# Patient Record
Sex: MALE | NOT HISPANIC OR LATINO
[De-identification: names, ages, dates, MRNs, and addresses within clinical notes are randomized per-mention and may not be internally consistent; named-entity substitution may affect disease eponyms.]

---

## 2021-06-07 ENCOUNTER — DASHBOARD ENCOUNTERS (OUTPATIENT)
Age: 51
End: 2021-06-07

## 2021-06-07 ENCOUNTER — OFFICE VISIT (OUTPATIENT)
Dept: URBAN - METROPOLITAN AREA CLINIC 12 | Facility: CLINIC | Age: 51
End: 2021-06-07
Payer: COMMERCIAL

## 2021-06-07 DIAGNOSIS — K64.9 HEMORRHOIDS, UNSPECIFIED HEMORRHOID TYPE: ICD-10-CM

## 2021-06-07 DIAGNOSIS — I10 ESSENTIAL HYPERTENSION: ICD-10-CM

## 2021-06-07 DIAGNOSIS — Z12.11 COLON CANCER SCREENING: ICD-10-CM

## 2021-06-07 DIAGNOSIS — K60.2 ANAL FISSURE: ICD-10-CM

## 2021-06-07 DIAGNOSIS — E66.01 MORBID OBESITY: ICD-10-CM

## 2021-06-07 PROBLEM — 59621000: Status: ACTIVE | Noted: 2021-06-07

## 2021-06-07 PROBLEM — 70153002: Status: ACTIVE | Noted: 2021-06-07

## 2021-06-07 PROBLEM — 238136002: Status: ACTIVE | Noted: 2021-06-07

## 2021-06-07 PROBLEM — 30037006: Status: ACTIVE | Noted: 2021-06-07

## 2021-06-07 PROCEDURE — 99203 OFFICE O/P NEW LOW 30 MIN: CPT | Performed by: INTERNAL MEDICINE

## 2021-06-07 RX ORDER — POLYETHYLENE GLYCOL 3350, SODIUM SULFATE, SODIUM CHLORIDE, POTASSIUM CHLORIDE, ASCORBIC ACID, SODIUM ASCORBATE 140-9-5.2G
AS DIRECTED KIT ORAL
Qty: 1 BOX | Refills: 0 | OUTPATIENT
Start: 2021-06-07 | End: 2021-06-09

## 2021-06-07 NOTE — HPI-TODAY'S VISIT:
51-year-old  gentleman who was referred for a GI evaluation by [facility].  He states that he was diagnosed with an anal fissure and had surgery in 1990. He also states that he has a history of hemorrhoids noted with history of small amount of discomfort with excessive wiping and small amount of blood. However there's otherwise been no rectal bleeding or melanoma or anemia or change in bowel habits or weight loss. He does have a history of hypertension and is on valsartan, hydrochlorothiazide and clonidine for this. He has type 2 diabetes mellitus and is on metformin for this. There has been no chest pain or shortness of breath or palpitations or heart problems. He has never had a colonoscopy.

## 2021-06-30 PROBLEM — 305058001: Status: ACTIVE | Noted: 2021-06-07

## 2021-07-14 ENCOUNTER — OFFICE VISIT (OUTPATIENT)
Dept: URBAN - METROPOLITAN AREA SURGERY CENTER 15 | Facility: SURGERY CENTER | Age: 51
End: 2021-07-14
Payer: COMMERCIAL

## 2021-07-14 DIAGNOSIS — Z12.11 COLON CANCER SCREENING: ICD-10-CM

## 2021-07-14 PROCEDURE — G8907 PT DOC NO EVENTS ON DISCHARG: HCPCS | Performed by: INTERNAL MEDICINE

## 2021-07-14 PROCEDURE — 45378 DIAGNOSTIC COLONOSCOPY: CPT | Performed by: INTERNAL MEDICINE
